# Patient Record
Sex: MALE | Race: WHITE | NOT HISPANIC OR LATINO | Employment: FULL TIME | URBAN - METROPOLITAN AREA
[De-identification: names, ages, dates, MRNs, and addresses within clinical notes are randomized per-mention and may not be internally consistent; named-entity substitution may affect disease eponyms.]

---

## 2018-06-13 ENCOUNTER — HOSPITAL ENCOUNTER (EMERGENCY)
Facility: HOSPITAL | Age: 28
Discharge: HOME | End: 2018-06-13
Attending: EMERGENCY MEDICINE

## 2018-06-13 VITALS
HEIGHT: 65 IN | WEIGHT: 180 LBS | TEMPERATURE: 98 F | DIASTOLIC BLOOD PRESSURE: 74 MMHG | OXYGEN SATURATION: 100 % | BODY MASS INDEX: 29.99 KG/M2 | SYSTOLIC BLOOD PRESSURE: 126 MMHG | RESPIRATION RATE: 12 BRPM | HEART RATE: 60 BPM

## 2018-06-13 DIAGNOSIS — L60.0 INGROWN TOENAIL OF LEFT FOOT WITH INFECTION: Primary | ICD-10-CM

## 2018-06-13 PROCEDURE — 25000003 PHARM REV CODE 250: Performed by: PHYSICIAN ASSISTANT

## 2018-06-13 PROCEDURE — 99283 EMERGENCY DEPT VISIT LOW MDM: CPT

## 2018-06-13 RX ORDER — TRAMADOL HYDROCHLORIDE 50 MG/1
50 TABLET ORAL
Status: COMPLETED | OUTPATIENT
Start: 2018-06-13 | End: 2018-06-13

## 2018-06-13 RX ORDER — CLINDAMYCIN HYDROCHLORIDE 300 MG/1
300 CAPSULE ORAL 3 TIMES DAILY
Qty: 21 CAPSULE | Refills: 0 | Status: SHIPPED | OUTPATIENT
Start: 2018-06-13 | End: 2018-06-20

## 2018-06-13 RX ORDER — TRAMADOL HYDROCHLORIDE 50 MG/1
50 TABLET ORAL EVERY 6 HOURS PRN
Qty: 12 TABLET | Refills: 0 | Status: SHIPPED | OUTPATIENT
Start: 2018-06-13 | End: 2018-06-23

## 2018-06-13 RX ADMIN — TRAMADOL HYDROCHLORIDE 50 MG: 50 TABLET, COATED ORAL at 09:06

## 2018-06-13 NOTE — ED NOTES
Patient verbalized appointment with Dr. Hoskins tomorrow at 1:00. Patient shown Dr. Hoskins number in case appointment needs to be rescheduled. Patient states he will f/u with Dr. Hoskins.

## 2018-06-13 NOTE — ED NOTES
Left great toe pain x 1 week. + redness and swelling to cuticle. States pain is 2/10 but significantly increases when he attempts to put his shoes on.

## 2018-06-13 NOTE — ED PROVIDER NOTES
"Encounter Date: 6/13/2018       History     Chief Complaint   Patient presents with    Toe Pain     Left big toe pain, redness, for one week.  Patient states pain has increased and now unable to put shoes secondary to pain.     Basil Espinosa 27 y.o. afebrile male with history of kidney stones, ADD an allergy to NSAIDs presented to the ED with c/o left great toe pain he states that he has had this pain for approximately 1 week.  He states that he knew that he had probable ingrown toenail for sometime in any he would try to cut the nail down himself but was unsuccessful.  He denies any known trauma to the area however states that his work shoes are quite tight around the area and appear to apply pressure to the affected region.  States pain is exacerbated by palpation and certain movements.  He denies any pain radiation, fever or chills. He states that he tried salt water soaks x1 last night with some improvement in pain. He has tried Tylenol with little relief of pain. He has not attempted any other medications and does not currently have a podiatrist.       The history is provided by the patient.     Review of patient's allergies indicates:   Allergen Reactions    Sulfa (sulfonamide antibiotics) Hives    Toradol [ketorolac] Swelling and Rash     Pt reacted to IV Toradol in the ER on 6/05/16- rash all over body and face, swelling of face, eyelids    Ibuprofen Rash     Pt reported allergy to Ibuprofen upon discharge from ER visit on 06/05/16. He did not mention this allergy while being treated in the ER. Stating- "I don't know if it makes a difference but I get a rash when taking Ibuprofen". Nurse explained to patient that he needs to report any reactions to medications to medical staff before being treated. Pt agrees.      Past Medical History:   Diagnosis Date    ADD (attention deficit disorder)     Kidney stones     Renal disorder      History reviewed. No pertinent surgical history.  Family History "   Problem Relation Age of Onset    Lupus Brother      Social History   Substance Use Topics    Smoking status: Former Smoker     Packs/day: 0.50     Types: Cigarettes     Quit date: 7/5/2014    Smokeless tobacco: Never Used    Alcohol use No     Review of Systems   Constitutional: Negative for chills and fever.   Eyes: Negative for visual disturbance.   Cardiovascular: Negative for leg swelling.   Gastrointestinal: Negative for nausea and vomiting.   Genitourinary: Negative for dysuria.   Musculoskeletal: Positive for arthralgias (left great toe pain). Negative for back pain, gait problem and joint swelling.   Skin: Positive for color change and wound. Negative for rash.   Neurological: Negative for weakness and numbness.   Hematological: Does not bruise/bleed easily.       Physical Exam     Initial Vitals [06/13/18 0820]   BP Pulse Resp Temp SpO2   139/87 76 15 97.9 °F (36.6 °C) 100 %      MAP       --         Physical Exam    Nursing note and vitals reviewed.  Constitutional: Vital signs are normal. He appears well-developed and well-nourished. He is cooperative.  Non-toxic appearance. He does not appear ill.   Mild distress   HENT:   Head: Normocephalic and atraumatic.   Eyes: Conjunctivae and lids are normal.   Neck: Normal range of motion. Neck supple.   Cardiovascular: Normal rate.   Pulmonary/Chest: No respiratory distress.   Abdominal: Soft. Normal appearance.   Musculoskeletal:        Left foot: There is tenderness and swelling. There is normal range of motion, no bony tenderness, no crepitus and no deformity.        Feet:    Patient has noted an ingrown toenail to the left great toe with some erythema along the medial nail fold with induration; no obvious fluctuance or active drainage at this time.   Neurological: He is alert and oriented to person, place, and time. GCS eye subscore is 4. GCS verbal subscore is 5. GCS motor subscore is 6.   Skin: Skin is warm, dry and intact. No rash noted. There is  erythema.   Psychiatric: He has a normal mood and affect. His speech is normal and behavior is normal. Thought content normal.         ED Course   Procedures  Labs Reviewed - No data to display       No orders to display       Basil Espinosa 27 y.o. afebrile male with history of kidney stones, ADD an allergy to NSAIDs presented to the ED with c/o left great toe pain he states that he has had this pain for approximately 1 week.  He states that he knew that he had probable ingrown toenail for sometime in any he would try to cut the nail down himself but was unsuccessful.  He denies any known trauma to the area however states that his work shoes are quite tight around the area and appear to apply pressure to the affected region.  States pain is exacerbated by palpation and certain movements.  He denies any pain radiation, fever or chills. He states that he tried salt water soaks x1 last night with some improvement in pain. He has tried Tylenol with little relief of pain. He has not attempted any other medications and does not currently have a podiatrist.  ROS positive for left toe pain.  Physical exam reveals patient well appearing in no distress. Patient has noted an ingrown toenail to the left great toe with some erythema along the medial nail fold with induration; no obvious fluctuance or active drainage at this time. FROM of all digits unaffected left lower extremity. Neurovascularly intact.     DDX:  Ingrown toenail uncomplicated versus infected, paronychia    ED management:  Patient does have ingrown toenail to the left great toe the does appear acutely inflamed however no signs of paronychia definitely at this time.  I did offer the patient partial removal of the nail here in the ED or the option of wait and see him begin antibiotics with soaks and to follow up with Podiatry tomorrow.  He appeared apprehensive to have the procedure today and elected to start antibiotics and follow up with Podiatry tomorrow.  I  did call and schedule appointment for the patient.  He was encouraged to continue epson soaks and to follow up with Podiatry as scheduled as he will probably need further management for ingrown toenails and his chronic appearing onychomycosis.    Impression/Plan: The encounter diagnosis was Ingrown toenail of left foot with infection.  Discharged with Ultram and Cleocin. Patient will follow up with Primary.  Patient cautioned on when to return to ED.  Pt. Understands and agrees with current treatment plan                           Clinical Impression:   The encounter diagnosis was Ingrown toenail of left foot with infection.                             MARICEL Rothman  06/14/18 0767

## 2023-02-01 ENCOUNTER — OFFICE VISIT (OUTPATIENT)
Dept: PSYCHIATRY | Facility: CLINIC | Age: 33
End: 2023-02-01

## 2023-02-01 VITALS
DIASTOLIC BLOOD PRESSURE: 82 MMHG | HEIGHT: 65 IN | SYSTOLIC BLOOD PRESSURE: 152 MMHG | BODY MASS INDEX: 27.58 KG/M2 | WEIGHT: 165.56 LBS | HEART RATE: 83 BPM

## 2023-02-01 DIAGNOSIS — Z13.30 ENCOUNTER FOR SCREENING EXAMINATION FOR MENTAL HEALTH AND BEHAVIORAL DISORDERS: Primary | ICD-10-CM

## 2023-02-01 PROCEDURE — 99999 PR PBB SHADOW E&M-NEW PATIENT-LVL III: CPT | Mod: PBBFAC,,, | Performed by: STUDENT IN AN ORGANIZED HEALTH CARE EDUCATION/TRAINING PROGRAM

## 2023-02-01 PROCEDURE — 99203 OFFICE O/P NEW LOW 30 MIN: CPT | Mod: PBBFAC | Performed by: STUDENT IN AN ORGANIZED HEALTH CARE EDUCATION/TRAINING PROGRAM

## 2023-02-01 PROCEDURE — 99205 OFFICE O/P NEW HI 60 MIN: CPT | Mod: S$PBB,,, | Performed by: STUDENT IN AN ORGANIZED HEALTH CARE EDUCATION/TRAINING PROGRAM

## 2023-02-01 PROCEDURE — 99205 PR OFFICE/OUTPT VISIT, NEW, LEVL V, 60-74 MIN: ICD-10-PCS | Mod: S$PBB,,, | Performed by: STUDENT IN AN ORGANIZED HEALTH CARE EDUCATION/TRAINING PROGRAM

## 2023-02-01 PROCEDURE — 99999 PR PBB SHADOW E&M-NEW PATIENT-LVL III: ICD-10-PCS | Mod: PBBFAC,,, | Performed by: STUDENT IN AN ORGANIZED HEALTH CARE EDUCATION/TRAINING PROGRAM

## 2023-02-01 NOTE — PROGRESS NOTES
2/1/2023 8:02 AM   Basil Espinosa   1990   [id number]           Outpatient Initial Psychiatry Evaluation         CHIEF COMPLIANT     Evaluation for custody dispute       HPI     Basil Espinosa, a 32 y.o. male, is presenting as a new patient to clinic.     Mother of his children is bringing patient to court. He describes a verbal argument with his ex in which she videoed the interaction on her phone. He was told he would need a psychiatric evaluation for the custody disputes in court. Ex has been denying patient to see his kids for past 3 months. Prior, patient took care of kids majority of the time. When patient and ex were together, patient also would have the kids throughout the days when ex was working at night. Patient's extended family has good relationships with the kids as well and patient's parents would watch the kids as well. Patient says there has never been any major issues throughout his 8 years being a father.       Life event tracker/ stressors/ relationships: as mentioned above      PSYCHIATRIC ROS:  Depressed mood: no  Sleep Disturbance: no  interest/pleasure/anhedonia: no  Negative-self talk /guilty/hopelessness: no  energy/anergy: no  concentration: no  Appetite disturbance: no  Self-injurious /risky behavior: no  Psychomotor disturbance: no  Suicidal Ideation:  no      Chronic daily anxiety/panic: no  Agoraphobia:  no  Social phobia:  no      Denies  severe panic associated with chest pain, palpitations, hyperventilation, sense of doom, diaphoresis.     Irritability: no  Anger Problems: no    Denies Symptoms of cyndi occur during a distinct episode that can last for  days in the context of a persistently elevated, expansive or irritable mood.   Distractibility: no  Impulsivity: no  Grandiosity: no  Flight of Ideas: no  Increased goal directed activity: no  Increased Talkativeness / Pressured Speech: no  Racing thoughts: no      Paranoia:no  Delusions: no  Auditory / Visual  Hallucinations:no      Intrusive distressing memories no  Recurrent nightmares:  no  Recurrent flashbacks: no  Hypervigilance: no  hyper startle response:  no  Avoidance: no      Denies being a victim of physical, sexual, neglectful, psychological abuse.     Obsessions/Recurrent thoughts:  no  Compulsions/ Recurrent behaviors:  no      Hyperactivity: no  Inattention / lack of focus / concentration difficulties: no are better explained / obscured by another disorder.     Denies eating disorder behaviors such as purging, taking laxatives, self-induced starvation, compensatory behaviors, binge eating.    Denies struggles with gender identity.       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  Previous Psychiatric Care: no  Previous Psychiatric Hospitalizations: no  Previous Suicide Attempts/ Non-suicidal self injury: no  Previous Medication Trials: yes; adderall, vyvanse, focalin       LEGAL, VIOLENCE:  History of Violence: no  Legal history: no  DWI / DUI: no  Access to Gun: no      SUBSTANCE ABUSE HISTORY:  Denies currently, or in the past, that patient or others feel/felt that patient has/ had a problem with alcohol, marijuana, street drugs, street  "pills.         PAST MEDICAL HISTORY:  No significant       NEUROLOGIC HISTORY:  Seizures: no  Head trauma: no      MEDICAL ROS:  Positives are in bold  1) General : chills or fever  2) Eyes:  visual changes  3) ENT: hearing change, nasal discharge or sore throat  4) Endocrine:  weight changes or polydipsia/polyuria  5) Dermatological:  rashes  6) Respiratory:  cough, shortness of breath  7) Cardiovascular:  chest pain, palpitations or racing heart  8) Gastrointestinal:  nausea, vomiting, constipation or diarrhea  9) Musculoskeletal:  muscle pain or stiffness  10) Neurological:  confusion, dizziness, headaches or tremors  11) Psychiatric: please see HPI       Objective     ALL MEDICATIONS:  Scheduled and PRN Medications     Current Outpatient Medications:     tamsulosin (FLOMAX) 0.4 mg Cp24, Take 1 capsule (0.4 mg total) by mouth once daily., Disp: 10 capsule, Rfl: 0    traMADol (ULTRAM) 50 mg tablet, Take 1 tablet (50 mg total) by mouth every 8 (eight) hours as needed for Pain., Disp: 15 tablet, Rfl: 0    ALLERGIES:  Review of patient's allergies indicates:   Allergen Reactions    Sulfa (sulfonamide antibiotics) Hives    Toradol [ketorolac] Swelling and Rash     Pt reacted to IV Toradol in the ER on 6/05/16- rash all over body and face, swelling of face, eyelids    Ibuprofen Rash     Pt reported allergy to Ibuprofen upon discharge from ER visit on 06/05/16. He did not mention this allergy while being treated in the ER. Stating- "I don't know if it makes a difference but I get a rash when taking Ibuprofen". Nurse explained to patient that he needs to report any reactions to medications to medical staff before being treated. Pt agrees.          RELEVANT LABS/STUDIES:    Not applicable     VITAL SIGNS:  Vitals:    02/01/23 0814   BP: (!) 152/82   Pulse: 83   Weight: 75.1 kg (165 lb 9.1 oz)   Height: 5' 5" (1.651 m)        PHYSICAL EXAM:   General: well developed, in no acute distress   Neurologic: no confusion, no " stiffness, no tremor   Gait: Normal   Psychomotor signs:  no psychomotor agitation or  psychomotor retardation   AIMS: none    PSYCHIATRIC EXAM:   Level of Consciousness: awake and alert  Orientation: orientated to situation, person, place, and time  Grooming: non- disheveled, well- groomed   General Manner/ Behavior: calm , pleasant , cooperative   Speech: normal volume, rate, and tone  Language: fluent and appropriate  Mood: good  Affect: mood-congruent, euthymic ; + social smile and laugh   Thought Process: linear, good motivation for goals   Associations: fully intact   Thought Content: no suicidal ideation homicidal ideation, auditory / visual hallucinations;   Memory: intact  Attention: intact  Fund of Knowledge: appropriate for education level   Insight: good  Judgment: good      Assessment and Diagnosis     GENERAL IMPRESSION:    There is no evidence in my evaluation that patient is struggling with any psychiatric disorders.     Complete psychiatric review of systems performed covering symptoms of depression, anxiety, substance use disorders, PTSD, cyndi, psychosis, obsessive compulsive disorder, ADHD, eating disorders, and gender identity disorders were all negative.       Intervention/Counseling/Treatment Plan     MEDICATION MANAGEMENT:    Not indicated     OTHER TREATMENT PLAN:    Not indicated        Return to Clinic:  not indicated      > than 50% of total time spend on coordination of care and counseling   (which included pts differential diagnosis and prognosis for psychiatric conditions, risks, benefits of treatments, instructions and adherence to treatment plan, risk reduction, reviewing current psychiatric medication regimen, medical problems and social stressors. In addtion to possible discussion with other healthcare provider/s)    The total time for services performed on the date of the encounter: 60 minutes      Joe Wiseman MD  2/1/2023 8:02 AM

## 2023-04-19 ENCOUNTER — HOSPITAL ENCOUNTER (EMERGENCY)
Facility: HOSPITAL | Age: 33
Discharge: HOME | End: 2023-04-19
Attending: EMERGENCY MEDICINE

## 2023-04-19 VITALS
TEMPERATURE: 98 F | HEART RATE: 74 BPM | RESPIRATION RATE: 16 BRPM | BODY MASS INDEX: 26.79 KG/M2 | SYSTOLIC BLOOD PRESSURE: 132 MMHG | DIASTOLIC BLOOD PRESSURE: 80 MMHG | OXYGEN SATURATION: 99 % | WEIGHT: 161 LBS

## 2023-04-19 DIAGNOSIS — T14.8XXA BITE BY ANIMAL: Primary | ICD-10-CM

## 2023-04-19 PROCEDURE — 25000003 PHARM REV CODE 250: Performed by: EMERGENCY MEDICINE

## 2023-04-19 PROCEDURE — 99283 EMERGENCY DEPT VISIT LOW MDM: CPT

## 2023-04-19 RX ORDER — AMOXICILLIN AND CLAVULANATE POTASSIUM 875; 125 MG/1; MG/1
1 TABLET, FILM COATED ORAL
Status: COMPLETED | OUTPATIENT
Start: 2023-04-19 | End: 2023-04-19

## 2023-04-19 RX ORDER — AMOXICILLIN AND CLAVULANATE POTASSIUM 875; 125 MG/1; MG/1
1 TABLET, FILM COATED ORAL 2 TIMES DAILY
Qty: 14 TABLET | Refills: 0 | Status: SHIPPED | OUTPATIENT
Start: 2023-04-19

## 2023-04-19 RX ADMIN — AMOXICILLIN AND CLAVULANATE POTASSIUM 1 TABLET: 875; 125 TABLET, FILM COATED ORAL at 03:04

## 2023-04-19 NOTE — DISCHARGE INSTRUCTIONS
Please take antibiotics as prescribed.  Tylenol Motrin as needed for pain.  Please also use topical over-the-counter antibiotics.  Please follow-up with primary care doctor, return the ER for any concerning reason or signs of infection.

## 2023-04-19 NOTE — ED NOTES
Pt reports to ED with c/o bite from mouse to right thumb. Small bite chelo noted to right thumb.     Adult Physical Assessment  LOC: Basil Espinosa, 32 y.o. male verified via two identifiers.  The patient is awake, alert, oriented and speaking appropriately at this time.  APPEARANCE: Patient resting comfortably and appears to be in no acute distress at this time. Patient is clean and well groomed, patient's clothing is properly fastened.  SKIN:The skin is warm and dry, color consistent with ethnicity, patient has normal skin turgor and moist mucus membranes. Small bite chelo to right thumb.   MUSCULOSKELETAL: Patient moving all extremities well, no obvious swelling or deformities noted.  RESPIRATORY: Airway is open and patent, respirations are spontaneous, patient has a normal effort and rate, no accessory muscle use noted.  CARDIAC: Patient has a normal rate and rhythm, no periphreal edema noted in any extremity, capillary refill < 3 seconds in all extremities  ABDOMEN: Soft and non tender to palpation, no abdominal distention noted. Bowel sounds present in all four quadrants.  NEUROLOGIC: Eyes open spontaneously, behavior appropriate to situation, follows commands, facial expression symmetrical, bilateral hand grasp equal and even, purposeful motor response noted, normal sensation in all extremities when touched with a finger.

## 2023-04-19 NOTE — ED PROVIDER NOTES
"Encounter Date: 2023       History     Chief Complaint   Patient presents with    mouse bite to finger     Patient has a mouse in his house that he cant get rid of. He woke tonight when the mouse bit him in his sleep. He has a small bite injury to his right thumb.      HPI    Pleasant healthy 32-year-old male presents the ER for evaluation of mouse bite to right thumb.  Patient reports he has a mouse that he is unable to trapped.  He reports he was sleeping in bed, his hand was hanging over the bed and then he felt a sharp bite.  He has small puncture wound on his thumb came the ER for further evaluation.    Review of patient's allergies indicates:   Allergen Reactions    Ibuprofen Rash     Pt reported allergy to Ibuprofen upon discharge from ER visit on 16. He did not mention this allergy while being treated in the ER. Stating- "I don't know if it makes a difference but I get a rash when taking Ibuprofen". Nurse explained to patient that he needs to report any reactions to medications to medical staff before being treated. Pt agrees.     Sulfa (sulfonamide antibiotics) Hives    Toradol [ketorolac] Swelling and Rash     Pt reacted to IV Toradol in the ER on 16- rash all over body and face, swelling of face, eyelids     Past Medical History:   Diagnosis Date    ADD (attention deficit disorder)     Kidney stones     Renal disorder      History reviewed. No pertinent surgical history.  Family History   Problem Relation Age of Onset    Lupus Brother      Social History     Tobacco Use    Smoking status: Former     Packs/day: 0.50     Types: Cigarettes     Quit date: 2014     Years since quittin.7    Smokeless tobacco: Never   Substance Use Topics    Alcohol use: No    Drug use: No     Review of Systems   Skin:  Positive for wound.   All other systems reviewed and are negative.    Physical Exam     Initial Vitals [23 0245]   BP Pulse Resp Temp SpO2   137/85 70 18 97.8 °F (36.6 °C) 98 %    " "  MAP       --         Physical Exam    Nursing note and vitals reviewed.  Constitutional: He appears well-developed and well-nourished. No distress.   HENT:   Head: Normocephalic and atraumatic.   Neck:   Normal range of motion.  Pulmonary/Chest: No respiratory distress.   Musculoskeletal:         General: No tenderness or edema. Normal range of motion.      Cervical back: Normal range of motion.      Comments: Small superficial bite of right thumb palmar surface distally, no other signs bites, no signs of infection normal range of motion     Neurological: GCS score is 15. GCS eye subscore is 4. GCS verbal subscore is 5. GCS motor subscore is 6.   Skin: Skin is warm and dry. Capillary refill takes less than 2 seconds.   Psychiatric: He has a normal mood and affect. Thought content normal.       ED Course   Procedures  Labs Reviewed - No data to display       Imaging Results    None          Medications   amoxicillin-clavulanate 875-125mg per tablet 1 tablet (1 tablet Oral Given 4/19/23 0326)     Medical Decision Making:   Initial Assessment:   This is a pleasant 32-year-old male presents the ER for evaluation of mouse bite to right thumb.  Patient was sleeping when a mask but his right thumb.  He came to the ER for evaluation of rabies prophylaxis antibiotics.  Patient arrived in the ER, no acute distress, overall well appearing, he does have superficial chelo on his right distal thumb there is no sign of infection, no other injury or complaints.  Wound was washed out, and patient will be discharged with oral antibiotics.  Return precautions discussed with patient understood agreed with and patient will be discharged    Per [facility] website "Bites of squirrels, hamsters, guinea pigs, gerbils, chipmunks, rats, mice, other small rodents, rabbits, and hares almost never require rabies postexposure prophylaxis." "mall rodents (like squirrels, hamsters, guinea pigs, gerbils, chipmunks, rats, and mice) and lagomorphs " "(including rabbits and hares) are almost never found to be infected with rabies and have not been known to transmit rabies to humans."Discussed with the patient and he is okay with not initiating rabies prophylaxis which I agreed with.                            Clinical Impression:   Final diagnoses:  [T14.8XXA] Bite by animal - Right thumb from mouse (Primary)        ED Disposition Condition    Discharge Stable          ED Prescriptions       Medication Sig Dispense Start Date End Date Auth. Provider    amoxicillin-clavulanate 875-125mg (AUGMENTIN) 875-125 mg per tablet Take 1 tablet by mouth 2 (two) times daily. 14 tablet 4/19/2023 -- Madalyn Sy MD          Follow-up Information    None          Madalyn Sy MD  04/19/23 3249    "

## 2023-04-19 NOTE — Clinical Note
"Basil Angellio Espinosa was seen and treated in our emergency department on 4/19/2023.  He may return to work on 04/20/2023.       If you have any questions or concerns, please don't hesitate to call.      Madalyn Sy MD"

## 2023-08-31 ENCOUNTER — PATIENT MESSAGE (OUTPATIENT)
Dept: PSYCHIATRY | Facility: CLINIC | Age: 33
End: 2023-08-31